# Patient Record
Sex: FEMALE | Race: BLACK OR AFRICAN AMERICAN | Employment: FULL TIME | URBAN - METROPOLITAN AREA
[De-identification: names, ages, dates, MRNs, and addresses within clinical notes are randomized per-mention and may not be internally consistent; named-entity substitution may affect disease eponyms.]

---

## 2017-06-11 ENCOUNTER — APPOINTMENT (OUTPATIENT)
Dept: MRI IMAGING | Age: 47
End: 2017-06-11
Attending: EMERGENCY MEDICINE
Payer: SUBSIDIZED

## 2017-06-11 ENCOUNTER — HOSPITAL ENCOUNTER (EMERGENCY)
Age: 47
Discharge: HOME | End: 2017-06-11
Attending: EMERGENCY MEDICINE
Payer: SUBSIDIZED

## 2017-06-11 ENCOUNTER — APPOINTMENT (OUTPATIENT)
Dept: CT IMAGING | Age: 47
End: 2017-06-11
Attending: EMERGENCY MEDICINE
Payer: SUBSIDIZED

## 2017-06-11 VITALS
HEIGHT: 63 IN | TEMPERATURE: 97.9 F | OXYGEN SATURATION: 100 % | RESPIRATION RATE: 16 BRPM | WEIGHT: 161.38 LBS | BODY MASS INDEX: 28.59 KG/M2 | DIASTOLIC BLOOD PRESSURE: 69 MMHG | HEART RATE: 71 BPM | SYSTOLIC BLOOD PRESSURE: 108 MMHG

## 2017-06-11 DIAGNOSIS — D64.9 ANEMIA, UNSPECIFIED: ICD-10-CM

## 2017-06-11 DIAGNOSIS — H54.61 VISUAL LOSS, RIGHT EYE: Primary | ICD-10-CM

## 2017-06-11 LAB
ALBUMIN SERPL BCP-MCNC: 3.3 G/DL (ref 3.5–5)
ALBUMIN/GLOB SERPL: 0.9 {RATIO} (ref 1.1–2.2)
ALP SERPL-CCNC: 56 U/L (ref 45–117)
ALT SERPL-CCNC: 17 U/L (ref 12–78)
ANION GAP BLD CALC-SCNC: 8 MMOL/L (ref 5–15)
AST SERPL W P-5'-P-CCNC: 13 U/L (ref 15–37)
ATRIAL RATE: 85 BPM
BASOPHILS # BLD AUTO: 0.1 K/UL (ref 0–0.1)
BASOPHILS # BLD: 2 % (ref 0–1)
BILIRUB SERPL-MCNC: 0.2 MG/DL (ref 0.2–1)
BUN SERPL-MCNC: 14 MG/DL (ref 6–20)
BUN/CREAT SERPL: 16 (ref 12–20)
CALCIUM SERPL-MCNC: 8.7 MG/DL (ref 8.5–10.1)
CALCULATED P AXIS, ECG09: 60 DEGREES
CALCULATED R AXIS, ECG10: 17 DEGREES
CALCULATED T AXIS, ECG11: 26 DEGREES
CHLORIDE SERPL-SCNC: 110 MMOL/L (ref 97–108)
CO2 SERPL-SCNC: 21 MMOL/L (ref 21–32)
CREAT SERPL-MCNC: 0.89 MG/DL (ref 0.55–1.02)
DIAGNOSIS, 93000: NORMAL
DIFFERENTIAL METHOD BLD: ABNORMAL
EOSINOPHIL # BLD: 0.2 K/UL (ref 0–0.4)
EOSINOPHIL NFR BLD: 4 % (ref 0–7)
ERYTHROCYTE [DISTWIDTH] IN BLOOD BY AUTOMATED COUNT: 20.7 % (ref 11.5–14.5)
ERYTHROCYTE [SEDIMENTATION RATE] IN BLOOD: 27 MM/HR (ref 0–20)
GLOBULIN SER CALC-MCNC: 3.5 G/DL (ref 2–4)
GLUCOSE SERPL-MCNC: 95 MG/DL (ref 65–100)
HCT VFR BLD AUTO: 26.7 % (ref 35–47)
HGB BLD-MCNC: 8 G/DL (ref 11.5–16)
INR PPP: 1 (ref 0.9–1.1)
LYMPHOCYTES # BLD AUTO: 20 % (ref 12–49)
LYMPHOCYTES # BLD: 0.9 K/UL (ref 0.8–3.5)
MCH RBC QN AUTO: 19.5 PG (ref 26–34)
MCHC RBC AUTO-ENTMCNC: 30 G/DL (ref 30–36.5)
MCV RBC AUTO: 65 FL (ref 80–99)
MONOCYTES # BLD: 0.4 K/UL (ref 0–1)
MONOCYTES NFR BLD AUTO: 8 % (ref 5–13)
NEUTS SEG # BLD: 2.8 K/UL (ref 1.8–8)
NEUTS SEG NFR BLD AUTO: 66 % (ref 32–75)
P-R INTERVAL, ECG05: 156 MS
PLATELET # BLD AUTO: 259 K/UL (ref 150–400)
POTASSIUM SERPL-SCNC: 3.9 MMOL/L (ref 3.5–5.1)
PROT SERPL-MCNC: 6.8 G/DL (ref 6.4–8.2)
PROTHROMBIN TIME: 10.3 SEC (ref 9–11.1)
Q-T INTERVAL, ECG07: 370 MS
QRS DURATION, ECG06: 68 MS
QTC CALCULATION (BEZET), ECG08: 440 MS
RBC # BLD AUTO: 4.11 M/UL (ref 3.8–5.2)
RBC MORPH BLD: ABNORMAL
SODIUM SERPL-SCNC: 139 MMOL/L (ref 136–145)
VENTRICULAR RATE, ECG03: 85 BPM
WBC # BLD AUTO: 4.4 K/UL (ref 3.6–11)

## 2017-06-11 PROCEDURE — 70450 CT HEAD/BRAIN W/O DYE: CPT

## 2017-06-11 PROCEDURE — 36415 COLL VENOUS BLD VENIPUNCTURE: CPT | Performed by: EMERGENCY MEDICINE

## 2017-06-11 PROCEDURE — 80053 COMPREHEN METABOLIC PANEL: CPT | Performed by: EMERGENCY MEDICINE

## 2017-06-11 PROCEDURE — A9585 GADOBUTROL INJECTION: HCPCS | Performed by: EMERGENCY MEDICINE

## 2017-06-11 PROCEDURE — 85652 RBC SED RATE AUTOMATED: CPT | Performed by: EMERGENCY MEDICINE

## 2017-06-11 PROCEDURE — 85610 PROTHROMBIN TIME: CPT | Performed by: EMERGENCY MEDICINE

## 2017-06-11 PROCEDURE — 74011250636 HC RX REV CODE- 250/636: Performed by: EMERGENCY MEDICINE

## 2017-06-11 PROCEDURE — 85025 COMPLETE CBC W/AUTO DIFF WBC: CPT | Performed by: EMERGENCY MEDICINE

## 2017-06-11 PROCEDURE — 93005 ELECTROCARDIOGRAM TRACING: CPT

## 2017-06-11 PROCEDURE — 74011000250 HC RX REV CODE- 250: Performed by: EMERGENCY MEDICINE

## 2017-06-11 PROCEDURE — 70553 MRI BRAIN STEM W/O & W/DYE: CPT

## 2017-06-11 PROCEDURE — 99285 EMERGENCY DEPT VISIT HI MDM: CPT

## 2017-06-11 RX ORDER — TETRACAINE HYDROCHLORIDE 5 MG/ML
1 SOLUTION OPHTHALMIC
Status: COMPLETED | OUTPATIENT
Start: 2017-06-11 | End: 2017-06-11

## 2017-06-11 RX ORDER — SODIUM CHLORIDE 0.9 % (FLUSH) 0.9 %
10 SYRINGE (ML) INJECTION
Status: COMPLETED | OUTPATIENT
Start: 2017-06-11 | End: 2017-06-11

## 2017-06-11 RX ADMIN — Medication 10 ML: at 10:04

## 2017-06-11 RX ADMIN — GADOBUTROL 7.5 ML: 604.72 INJECTION INTRAVENOUS at 10:04

## 2017-06-11 RX ADMIN — TETRACAINE HYDROCHLORIDE 1 DROP: 5 SOLUTION OPHTHALMIC at 06:05

## 2017-06-11 NOTE — ED TRIAGE NOTES
Patient arriving c/o vision loss to RIGHT eye at 0400, reports \"it's like a curtain is dropping\". Patient reports some increased pressure to affected eye.

## 2017-06-11 NOTE — ED NOTES
Patient resting in room, denies needs at this time. Patient aware of MRI and process to obtain that, no questions/concerns verbalized.

## 2017-06-11 NOTE — LETTER
[address] 
[address] [facility] [place] [facility] [id number] [phone] Work/School Note Date: 6/11/2017 To Whom It May concern: 
 
Susannah Larsen was in the emergency room overnight Julianna 10-11, 2017. She is excused from early morning [employer] service today June 11, 2017 Sincerely, 
 
 
 
 
Sadie Martinez MD

## 2017-06-11 NOTE — ED NOTES
S/o to me by Dr Giancarlo Armando at end of shift. MRI pending. Pt scheduled to see Eye at 2 pm.  I performed bedside US of R eye looking for evidence of retinal pathology/detachment, and I saw no evidence of it. Darin Alfaro, DO    11:01 AM - went over all results. No PCP. Will see Eye later. MRI normal.  hgb is 8 today. No hx of anemia. Has heavy menstrual periods. Sometimes she has clots. Cycles q 28 days. Bleeds for 5-7 days. Heavy for 3 days then lighter. Denies melena or hematochezia. Sometimes has weakness that is fleeting. No excessive sleeping. Eats meat. Has no insurance. Will refer to new PCP. Also will give list of free clinics. She knows she needs this worked up.

## 2017-06-11 NOTE — ED PROVIDER NOTES
Patient is a 40 y.o. female presenting with eye problem. Eye Problem    Pertinent negatives include no nausea, no vomiting and no fever. 40year old female without significant PMHx presents with waking up around 4am and noticing inability to see from her right eye. Left eye vision is normal.  She feels a haziness/gray film over her eye which she decribes as \"a curtain dropping\"  She reports some mild discomfort in the eye. NO redness or tearing. Went to bed at 9:30pm feeling fine. No headache nausea or vomiting. She also feels numbness to the right side of her face. No chest pain or palpitations. No recent illness. No change in speech or focal weakness or numbness. No past medical history on file. No past surgical history on file. No family history on file. Social History     Social History    Marital status: N/A     Spouse name: N/A    Number of children: N/A    Years of education: N/A     Occupational History    Not on file. Social History Main Topics    Smoking status: Never Smoker    Smokeless tobacco: Not on file    Alcohol use No    Drug use: Not on file    Sexual activity: Not on file     Other Topics Concern    Not on file     Social History Narrative    No narrative on file         ALLERGIES: Review of patient's allergies indicates no known allergies. Review of Systems   Constitutional: Negative for chills and fever. HENT: Negative for congestion. Eyes: Negative for visual disturbance. Respiratory: Negative for cough and shortness of breath. Gastrointestinal: Negative for abdominal pain, nausea and vomiting. Endocrine: Negative for polyuria. Genitourinary: Negative for dysuria. Musculoskeletal: Negative for gait problem. Skin: Negative for rash. Neurological: Negative for headaches. Psychiatric/Behavioral: Negative for dysphoric mood.        Vitals:    06/11/17 0529   BP: 122/83   Pulse: 83   Resp: 16   Temp: 97.9 °F (36.6 °C)   SpO2: 100%   Weight: 73.2 kg (161 lb 6 oz)   Height: 5' 3\" (1.6 m)            Physical Exam   Constitutional: She is oriented to person, place, and time. She appears well-developed and well-nourished. No distress. HENT:   Head: Normocephalic and atraumatic. Mouth/Throat: Oropharynx is clear and moist. No oropharyngeal exudate. Eyes: Conjunctivae and EOM are normal. Right eye exhibits no discharge. Left eye exhibits no discharge. Right conjunctiva is not injected. Right conjunctiva has no hemorrhage. Left conjunctiva is not injected. Left conjunctiva has no hemorrhage. No scleral icterus. Right eye exhibits normal extraocular motion and no nystagmus. Left eye exhibits normal extraocular motion and no nystagmus. Right pupil is not reactive. Afferent pupillary defect on right   Neck: Normal range of motion. Neck supple. No JVD present. Cardiovascular: Normal rate, regular rhythm, normal heart sounds and intact distal pulses. Exam reveals no gallop and no friction rub. No murmur heard. Pulmonary/Chest: Effort normal and breath sounds normal. No stridor. No respiratory distress. She has no wheezes. She has no rales. She exhibits no tenderness. Abdominal: Soft. Bowel sounds are normal. She exhibits no distension and no mass. There is no tenderness. There is no rebound and no guarding. Musculoskeletal: Normal range of motion. She exhibits no edema or tenderness. Neurological: She is alert and oriented to person, place, and time. She has normal reflexes. No cranial nerve deficit. She exhibits normal muscle tone. Coordination normal.   Skin: Skin is warm and dry. No rash noted. No erythema. Psychiatric: She has a normal mood and affect. Her behavior is normal. Judgment and thought content normal.        [facility]  ED Course       Procedures      ED EKG interpretation:  Rhythm: normal sinus rhythm; and regular . Rate (approx.): 85; Axis: normal; P wave: normal; QRS interval: normal ; ST/T wave: non-specific changes; This EKG was interpreted by Veto Bello MD,ED Provider. Spoke with Dr. Savannah Merrill- ophthamology: likely optic neuritis given presentation and with facial numbness consider MS. Could also be vein occlusion. Recommends getting MRI with and without. He will see at 2pm at [place] office if MRI neg. Care turned over to Dr. Alessandra Gomez at change of shift. Bedside ultrasound done without sign of retinal detachment.

## 2017-06-11 NOTE — ED NOTES
Bedside shift change report given to Dory Pryor (oncoming nurse) by Shaheen Clarke (offgoing nurse). Report included the following information SBAR, ED Summary, MAR and Recent Results.